# Patient Record
Sex: MALE | Race: WHITE
[De-identification: names, ages, dates, MRNs, and addresses within clinical notes are randomized per-mention and may not be internally consistent; named-entity substitution may affect disease eponyms.]

---

## 2019-03-29 ENCOUNTER — HOSPITAL ENCOUNTER (OUTPATIENT)
Dept: HOSPITAL 39 - CT | Age: 64
End: 2019-03-29
Attending: FAMILY MEDICINE
Payer: COMMERCIAL

## 2019-03-29 DIAGNOSIS — R91.8: ICD-10-CM

## 2019-03-29 DIAGNOSIS — Z87.891: Primary | ICD-10-CM

## 2019-03-30 NOTE — CT
Procedure:  CT LUNG SCREENING        



Exam Date:  3/29/2019.



Ordering Provider:  Octavio Verde



Clinical Indication:  LONG TERM SMOKER . One half pack per day

for greater than 20 years. This patient meets eligibility

criteria for low-dose CT lung cancer screening.



Comparison: CT chest with IV contrast 12/18/2017.



Technique:  Using a multislice scanner, sequential helical axial

imaging was obtained in the thorax, 2.5 mm thickness, 2.5 mm

separation, from the level of the thoracic inlet through the lung

bases without IV contrast. A low dose protocol was utilized for

BMI less than 30: BMI: 15.5.  CTDI: 1.76 mGy.  120. kVp.  45 mA.

DLP 75.84 mGy-centimeters. 2D sagittal and coronal reconstructed

images, 6.0 mm thickness, were obtained. This exam was performed

according to our departmental dose optimization program which

includes use of automated exposure control, adjustment of the mA

and/or kV according to patient size and/or use of iterative

reconstruction technique.  Nodule measurements under 10 mm are

given as mean value of 3 axes diameters.



FINDINGS: 

Lungs and large airways: In the medial left upper lobe, a large

lobulated mass is present abutting the left mediastinum and the

medial left apex. On soft tissue windows, there appears to be

invasion of both the mediastinum and pleura and the anterior left

apical pleura. Evaluation limited due to lack of IV contrast.

Largest dimensions of this mass in the transverse plane are 7.0 x

5.8 cm. Approximately 5.5 cm craniocaudal. Transverse dimension

of the mass on the prior study was 3.8 x 3.3 cm. Inferior aspect

of the mass is abutting the superior left hilum and the

mediastinum abutting the aortic arch. Multiple bilateral

emphysematous blebs and bulla larger and more numerous in the

upper lung fields compared to the lower lung fields. This

reflects a centrilobular distribution. In the lateral left apex

multiple blebs with thickened septa have developed since the

prior study with stable posterior pleural thickening. Lateral

right apical pleural thickening with large bulla is again seen. 3

mm subpleural nodule in the left lower lobe is stable. No other

abnormal nodules or masses bilaterally.



Pleura and space: Abnormalities of the pleura in the bilateral

upper lobes and apices as previously described. No pneumothorax

or pleural effusion.



Mediastinum and roger: evaluation limited by low dose technique

and lack of IV contrast.  As previously described, it is

interpreted that the superior left mediastinum from the apex to

the aortic arch may be invaded by the left upper lobe mass. A

enlarged superior left hilar nodes are suspected.



Heart and great vessels: No cardiomegaly. Atherosclerotic

calcifications in the proximal brachiocephalic vessels and aortic

arch



Chest wall, lower neck, axillae: Evaluation also limited by same

factors as described above.  Invasion of the anterior left apical

pleura and chest wall is suspected. Heterogeneous thyroid gland.

No enlarged axillary nodes.



Upper abdomen: Evaluation of the peritoneum is difficult due to

patient's small body habitus. Minimal atherosclerotic

calcifications in the abdominal aorta.



Osseous structures: Evaluation limited by low dose MIP technique.

 Minimal spondylosis in the thoracic spine. Arthrosis between the

manubrium and the sternum. Bilateral sternoclavicular arthrosis.

Prior rotator cuff surgery right humeral head. No lytic or

blastic lesions.



IMPRESSION: 

1. Large mass measuring 7 cm in the transverse plane and at least

5 cm in the craniocaudal plane in the medial left upper lobe apex

has enlarged compared to the prior routine CT chest study in

December 2017. Suspect invasion of the left superior pleura and

mediastinum and the left apical anterior pleura and chest wall.

Evaluation of the soft tissue extent of this mass is limited due

to lack of IV contrast. Severe emphysematous changes bilateral

upper and lower lobes.  [facility] Best Practice guidelines:

Please see below for Lung RADS category and FOLLOW-UP.*



*Lung RADS category Category 4x - Suspicious - findings for which

additional diagnostic testing and/or tissue sampling is

recommended (greater than 15% malignancy probability).



Nodules: Category 3 or 4 nodule(s) with additional features or

imaging findings that increases the suspicion of malignancy.  



Follow-up: Please return for a Chest CT with or without contrast,

PET/CT and/or tissue sampling depending on the "probability of

malignancy and comorbidities."  PET/CT may be used when there is

an 8mm or greater solid component. 



Electronically signed by:  Andrea Borja MD  3/30/2019 4:42 PM CDT

Workstation: [id number]